# Patient Record
Sex: MALE | Race: OTHER
[De-identification: names, ages, dates, MRNs, and addresses within clinical notes are randomized per-mention and may not be internally consistent; named-entity substitution may affect disease eponyms.]

---

## 2018-03-12 ENCOUNTER — HOSPITAL ENCOUNTER (EMERGENCY)
Dept: HOSPITAL 91 - E/R | Age: 48
Discharge: LEFT BEFORE DISCHARGE | End: 2018-03-12
Payer: SELF-PAY

## 2018-03-12 ENCOUNTER — HOSPITAL ENCOUNTER (EMERGENCY)
Age: 48
Discharge: LEFT BEFORE DISCHARGE | End: 2018-03-12

## 2018-03-12 DIAGNOSIS — Z53.21: Primary | ICD-10-CM

## 2018-03-12 PROCEDURE — 93005 ELECTROCARDIOGRAM TRACING: CPT

## 2018-03-13 ENCOUNTER — HOSPITAL ENCOUNTER (EMERGENCY)
Dept: HOSPITAL 54 - ER | Age: 48
LOS: 1 days | Discharge: HOME | End: 2018-03-14
Payer: MEDICAID

## 2018-03-13 VITALS — WEIGHT: 186 LBS | HEIGHT: 74 IN | BODY MASS INDEX: 23.87 KG/M2

## 2018-03-13 DIAGNOSIS — R07.89: Primary | ICD-10-CM

## 2018-03-13 DIAGNOSIS — F10.129: ICD-10-CM

## 2018-03-13 PROCEDURE — G0480 DRUG TEST DEF 1-7 CLASSES: HCPCS

## 2018-03-13 PROCEDURE — Z7610: HCPCS

## 2018-03-13 PROCEDURE — A4606 OXYGEN PROBE USED W OXIMETER: HCPCS

## 2018-03-14 VITALS — SYSTOLIC BLOOD PRESSURE: 135 MMHG | DIASTOLIC BLOOD PRESSURE: 88 MMHG

## 2018-03-14 LAB
APTT PPP: 26 SEC (ref 23–34)
BASOPHILS # BLD AUTO: 0 /CMM (ref 0–0.2)
BASOPHILS NFR BLD AUTO: 0.1 % (ref 0–2)
BUN SERPL-MCNC: 12 MG/DL (ref 7–18)
CALCIUM SERPL-MCNC: 8.6 MG/DL (ref 8.5–10.1)
CHLORIDE SERPL-SCNC: 106 MMOL/L (ref 98–107)
CO2 SERPL-SCNC: 23 MMOL/L (ref 21–32)
CREAT SERPL-MCNC: 0.6 MG/DL (ref 0.6–1.3)
EOSINOPHIL # BLD AUTO: 0.1 /CMM (ref 0–0.7)
EOSINOPHIL NFR BLD AUTO: 1.3 % (ref 0–6)
GLUCOSE SERPL-MCNC: 90 MG/DL (ref 74–106)
HCT VFR BLD AUTO: 43 % (ref 39–51)
HGB BLD-MCNC: 15.1 G/DL (ref 13.5–17.5)
INR PPP: 0.88 (ref 0.87–1.13)
LYMPHOCYTES NFR BLD AUTO: 2.9 /CMM (ref 0.8–4.8)
LYMPHOCYTES NFR BLD AUTO: 48.8 % (ref 20–44)
MCH RBC QN AUTO: 33 PG (ref 26–33)
MCHC RBC AUTO-ENTMCNC: 35 G/DL (ref 31–36)
MCV RBC AUTO: 95 FL (ref 80–96)
MONOCYTES NFR BLD AUTO: 0.4 /CMM (ref 0.1–1.3)
MONOCYTES NFR BLD AUTO: 6.6 % (ref 2–12)
NEUTROPHILS # BLD AUTO: 2.6 /CMM (ref 1.8–8.9)
NEUTROPHILS NFR BLD AUTO: 43.2 % (ref 43–81)
PLATELET # BLD AUTO: 221 /CMM (ref 150–450)
POTASSIUM SERPL-SCNC: 3.5 MMOL/L (ref 3.5–5.1)
RBC # BLD AUTO: 4.57 MIL/UL (ref 4.5–6)
RDW COEFFICIENT OF VARIATION: 13.8 (ref 11.5–15)
SODIUM SERPL-SCNC: 144 MMOL/L (ref 136–145)
TROPONIN I SERPL-MCNC: < 0.017 NG/ML (ref 0–0.06)
WBC NRBC COR # BLD AUTO: 5.9 K/UL (ref 4.3–11)

## 2018-03-14 NOTE — NUR
PT ASLEEP, NO ACUTE DISTRESS NOTED, RESP EVEN AND UNLABORED. NO PAIN OR 
DISCOMFORT NOTED AT THIS TIME. CALL LIGHT WITHIN REACH. WILL CONTINUE TO 
MONITOR PT CLOSELY.

## 2018-03-14 NOTE — NUR
PT WAS SLEEPING WITH THE URINAL IN HIS HAND. NO URINE AT THIS TIME. PT WAS 
EASILY AROUSED AND STATED THAT HE WOULD TRY TO GIVE A SAMPLE.

## 2018-03-14 NOTE — NUR
IV removed. Catheter intact and site benign. Pressure and 4x4 applied to site. 
No bleeding noted. Patient discharged to home in stable condition. Written and 
verbal after care instructions given. Patient verbalizes understanding of 
instruction. Ambulatory with a steady gait. instructed pt not to drive. pt 
verbalize understanding.

## 2018-03-14 NOTE — NUR
PT APPEARS TO BE SLEEPING COMFORTABLY. PT WAS EASILY AROUSED. PT WAS GIVEN A 
URINAL AND IS TRYING TO GIVE A URINE SAMPLE.